# Patient Record
Sex: FEMALE | Race: WHITE
[De-identification: names, ages, dates, MRNs, and addresses within clinical notes are randomized per-mention and may not be internally consistent; named-entity substitution may affect disease eponyms.]

---
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
## 2020-08-14 ENCOUNTER — EMERGENCY (EMERGENCY)
Facility: HOSPITAL | Age: 20
LOS: 0 days | Discharge: HOME | End: 2020-08-14
Payer: COMMERCIAL

## 2020-08-14 VITALS
OXYGEN SATURATION: 100 % | DIASTOLIC BLOOD PRESSURE: 63 MMHG | TEMPERATURE: 99 F | HEART RATE: 90 BPM | SYSTOLIC BLOOD PRESSURE: 116 MMHG | RESPIRATION RATE: 20 BRPM

## 2020-08-14 DIAGNOSIS — Z11.59 ENCOUNTER FOR SCREENING FOR OTHER VIRAL DISEASES: ICD-10-CM

## 2020-08-14 PROCEDURE — U0003: CPT

## 2020-08-14 PROCEDURE — 99283 EMERGENCY DEPT VISIT LOW MDM: CPT

## 2020-08-14 NOTE — ED STATDOCS - PATIENT PORTAL LINK FT
You can access the FollowMyHealth Patient Portal offered by [facility] by registering at the following website: [email or web address]. By joining [employer]’s FollowMyHealth portal, you will also be able to view your health information using other applications (apps) compatible with our system.

## 2020-08-14 NOTE — ED STATDOCS - OBJECTIVE STATEMENT
Pt presents to ED for covid swab. needs swab for school. No complaints at this time  Pt here for testing.

## 2020-08-14 NOTE — ED STATDOCS - PHYSICAL EXAMINATION
Constitutional: NAD AAOx3. Nontoxic, well appearing. Speaking full sentences  w/o distress  Eyes: EOMI, pupils equal  Head: Normocephalic atraumatic  Mouth: no airway obstruction  Cardiac: [id number]   Resp: Lungs CTAB  GI: Abd s/nt/nd  Neuro: motor and sensory intact

## 2020-08-15 LAB — SARS-COV-2 RNA SPEC QL NAA+PROBE: SIGNIFICANT CHANGE UP

## 2020-09-04 ENCOUNTER — EMERGENCY (EMERGENCY)
Facility: HOSPITAL | Age: 20
LOS: 0 days | Discharge: HOME | End: 2020-09-04
Payer: COMMERCIAL

## 2020-09-04 VITALS
HEART RATE: 101 BPM | DIASTOLIC BLOOD PRESSURE: 64 MMHG | TEMPERATURE: 98 F | SYSTOLIC BLOOD PRESSURE: 110 MMHG | RESPIRATION RATE: 15 BRPM | OXYGEN SATURATION: 100 %

## 2020-09-04 DIAGNOSIS — Z20.828 CONTACT WITH AND (SUSPECTED) EXPOSURE TO OTHER VIRAL COMMUNICABLE DISEASES: ICD-10-CM

## 2020-09-04 PROCEDURE — 99283 EMERGENCY DEPT VISIT LOW MDM: CPT

## 2020-09-04 PROCEDURE — U0003: CPT

## 2020-09-04 NOTE — ED STATDOCS - PHYSICAL EXAMINATION
Constitutional: NAD AAOx3. Nontoxic, well appearing. Speaking full sentences  w/o distress  Eyes: EOMI, pupils equal  Head: Normocephalic atraumatic  Mouth: no airway obstruction  Cardiac: [id number]   Resp: Lungs CTA B/L  Abd: soft, nd. NTTP. No r/g/r.   Neuro: motor and sensory intact

## 2020-09-04 NOTE — ED STATDOCS - PATIENT PORTAL LINK FT
You can access the FollowMyHealth Patient Portal offered by [facility] by registering at the following website: [email or web address]. By joining [employer]’s FollowMyHealth portal, you will also be able to view your health information using other applications (apps) compatible with our system.

## 2020-09-05 LAB — SARS-COV-2 RNA SPEC QL NAA+PROBE: SIGNIFICANT CHANGE UP

## 2020-11-24 ENCOUNTER — OUTPATIENT (OUTPATIENT)
Dept: OUTPATIENT SERVICES | Facility: HOSPITAL | Age: 20
LOS: 1 days | End: 2020-11-24
Payer: COMMERCIAL

## 2020-11-24 DIAGNOSIS — Z11.59 ENCOUNTER FOR SCREENING FOR OTHER VIRAL DISEASES: ICD-10-CM

## 2020-11-24 LAB — SARS-COV-2 RNA SPEC QL NAA+PROBE: SIGNIFICANT CHANGE UP

## 2020-11-24 PROCEDURE — U0003: CPT

## 2020-11-25 DIAGNOSIS — Z11.59 ENCOUNTER FOR SCREENING FOR OTHER VIRAL DISEASES: ICD-10-CM

## 2023-03-21 ENCOUNTER — OFFICE (OUTPATIENT)
Dept: URBAN - METROPOLITAN AREA CLINIC 102 | Facility: CLINIC | Age: 23
Setting detail: OPHTHALMOLOGY
End: 2023-03-21
Payer: COMMERCIAL

## 2023-03-21 DIAGNOSIS — H52.13: ICD-10-CM

## 2023-03-21 DIAGNOSIS — H16.223: ICD-10-CM

## 2023-03-21 DIAGNOSIS — H16.221: ICD-10-CM

## 2023-03-21 DIAGNOSIS — H16.222: ICD-10-CM

## 2023-03-21 PROBLEM — H52.7 REFRACTIVE ERROR: Status: ACTIVE | Noted: 2023-03-21

## 2023-03-21 PROCEDURE — 92004 COMPRE OPH EXAM NEW PT 1/>: CPT | Performed by: OPHTHALMOLOGY

## 2023-03-21 PROCEDURE — 92015 DETERMINE REFRACTIVE STATE: CPT | Performed by: OPHTHALMOLOGY

## 2023-03-21 ASSESSMENT — REFRACTION_MANIFEST
OD_VA1: 20/20
OD_SPHERE: -1.00
OS_SPHERE: +0.50
OD_CYLINDER: -0.50
OU_VA: 20/20
OS_CYLINDER: SPH
OS_VA1: 20/20
OD_AXIS: 075

## 2023-03-21 ASSESSMENT — CONFRONTATIONAL VISUAL FIELD TEST (CVF)
OS_FINDINGS: FULL
OD_FINDINGS: FULL

## 2023-03-21 ASSESSMENT — KERATOMETRY
OS_AXISANGLE_DEGREES: 090
OS_K2POWER_DIOPTERS: 46.25
OS_K1POWER_DIOPTERS: 46.25
OD_K2POWER_DIOPTERS: 45.50
OD_AXISANGLE_DEGREES: 110
METHOD_AUTO_MANUAL: AUTO
OD_K1POWER_DIOPTERS: 45.25

## 2023-03-21 ASSESSMENT — REFRACTION_AUTOREFRACTION
OS_CYLINDER: -0.25
OD_SPHERE: -1.00
OS_AXIS: 080
OD_CYLINDER: -0.50
OD_AXIS: 077
OS_SPHERE: +0.50

## 2023-03-21 ASSESSMENT — REFRACTION_CURRENTRX
OS_SPHERE: +0.50
OS_AXIS: 180
OD_OVR_VA: 20/
OS_OVR_VA: 20/
OS_CYLINDER: SPH
OD_AXIS: 084
OD_CYLINDER: -0.25
OD_SPHERE: -1.50

## 2023-03-21 ASSESSMENT — TONOMETRY
OS_IOP_MMHG: 15
OD_IOP_MMHG: 15

## 2023-03-21 ASSESSMENT — SPHEQUIV_DERIVED
OS_SPHEQUIV: 0.375
OD_SPHEQUIV: -1.25
OD_SPHEQUIV: -1.25

## 2023-03-21 ASSESSMENT — VISUAL ACUITY
OD_BCVA: 20/20-
OS_BCVA: 20/50+2

## 2023-03-21 ASSESSMENT — AXIALLENGTH_DERIVED
OD_AL: 23.3919
OS_AL: 22.4897
OD_AL: 23.3919

## 2023-12-04 ENCOUNTER — EMERGENCY (EMERGENCY)
Facility: HOSPITAL | Age: 23
LOS: 0 days | Discharge: HOME | End: 2023-12-04
Attending: STUDENT IN AN ORGANIZED HEALTH CARE EDUCATION/TRAINING PROGRAM
Payer: COMMERCIAL

## 2023-12-04 VITALS
OXYGEN SATURATION: 97 % | SYSTOLIC BLOOD PRESSURE: 124 MMHG | HEART RATE: 81 BPM | TEMPERATURE: 98 F | RESPIRATION RATE: 20 BRPM | DIASTOLIC BLOOD PRESSURE: 87 MMHG

## 2023-12-04 VITALS — WEIGHT: 162.04 LBS | HEIGHT: 63 IN

## 2023-12-04 DIAGNOSIS — S03.00XA DISLOCATION OF JAW, UNSPECIFIED SIDE, INITIAL ENCOUNTER: ICD-10-CM
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
## 2023-12-04 PROCEDURE — 99282 EMERGENCY DEPT VISIT SF MDM: CPT | Mod: 25

## 2023-12-04 PROCEDURE — 21480 CLTX TMPRMAND DISLC 1ST/SBSQ: CPT

## 2023-12-04 PROCEDURE — 21480 CLTX TMPRMAND DISLC 1ST/SBSQ: CPT | Mod: RT

## 2023-12-04 PROCEDURE — 99283 EMERGENCY DEPT VISIT LOW MDM: CPT | Mod: 25

## 2023-12-04 NOTE — ED STATDOCS - NS ED ROS FT
General: No fever, no nausea, no vomiting  HEENT: No loc, no ha, no dizziness  Respiratory: no trouble breathing  Cardiovascular: No chest pain  GI: No abdominal pain, no diarrhea  : No urinary complaints  Endocrine: no generalized weakness  Neurological: No weakness, numbness  MSK: no recent trauma

## 2023-12-04 NOTE — ED STATDOCS - OBJECTIVE STATEMENT
24 y/o female with no pertinent PMHx presents to the ED c/o locked jaw since 30 min PTA. Has history of similar episodes x2 2 years ago. Endorses mild pain to area. Denies numbness, weakness to area. 24 y/o female with no pertinent PMHx presents to the ED c/o locked jaw since 30 min PTA. states she yawned and then jaw got stuck. Has history of similar episodes x2 2 years ago. Endorses mild pain to area. Denies numbness, weakness to area. no trauma or fall 22 y/o female with no pertinent PMHx presents to the ED c/o locked jaw since 30 min PTA. states she yawned and then jaw got stuck. Has history of similar episodes x2 2 years ago. Endorses mild pain to area. Denies numbness, weakness to area. no trauma or fall

## 2023-12-04 NOTE — ED STATDOCS - CLINICAL SUMMARY MEDICAL DECISION MAKING FREE TEXT BOX
Most likely jaw mandible dislocation. Plan jaw reduction, pain control, reassess, dc with follow up.

## 2023-12-04 NOTE — ED PROCEDURE NOTE - CPROC ED TIME OUT STATEMENT1
“Patient's name, , procedure and correct site were confirmed during the [facility] Timeout.” “Patient's name, , procedure and correct site were confirmed during the [facility] Timeout.”

## 2023-12-04 NOTE — ED STATDOCS - NSFOLLOWUPINSTRUCTIONS_ED_ALL_ED_FT
Jaw Dislocation  Side view of the head showing the jawbones. One close-up shows a normal joint. Another close-up shows a dislocated joint.  Jaw dislocation is displacement of the temporomandibular joint, which is the place where the upper jawbone (maxilla) and the lower jawbone (mandible) meet. Soon after the dislocation, the jaw muscles tighten, causing pain and difficulty moving the mouth and jaw normally.    What are the causes?  Common causes of this condition include:  A hard, direct hit or injury (trauma) to the jaw.  Opening the mouth too widely. This can be caused by:  Eating.  Yawning.  Laughing.  Vomiting.  Having a dental procedure.  Receiving medicine by mouth to make you fall asleep (general anesthetic).  Seizures or a side effect of certain medicines (dystonic reaction).  What increases the risk?  You are more likely to develop this condition if:  You have dislocated your jaw before.  You play contact sports.  Your jaw is loose or can move beyond the normal range.  You have certain connective tissue disorders that can make the jaw ligaments looser, such as Marfan syndrome or Juan Jose–Danlos syndrome. Ligaments are tissues that connect bones to each other.  What are the signs or symptoms?  Symptoms of this condition vary depending on the severity of the dislocation. Symptoms may include:  Severe pain, especially when trying to move your jaw.  Not being able to move your jaw or close your mouth completely or at all.  Feeling that your teeth do not line up as they usually do when you bite.  Drooling.  Trouble speaking or swallowing.  How is this diagnosed?  This condition is diagnosed based on your medical history and a physical exam. Your health care provider will ask you to move your jaw, and he or she will feel your temporomandibular joints. Your health care provider will also check the inside of your mouth for:  Breaks (fractures) in your jawbone.  Cuts (lacerations).  In certain cases, such as those involving trauma, imaging studies such as X-rays or CT scans may be done. These help to make sure there is no other underlying injury, such as a facial fracture.    How is this treated?  The most common treatment for this condition is to have your health care provider move your jaw back into place by hand (manual reduction). In difficult cases, medicines may be given for pain to reduce muscle tension before the procedure. Your health care provider will determine if this is appropriate. Also, your health care provider may recommend:  NSAIDs, such as ibuprofen, to reduce pain and swelling.  Medicines to relax your muscles.  A neck brace to support your lower jawbone.  A temporary elastic bandage that wraps around your jaw and your head, to make sure your jaw does not dislocate after the reduction.  Follow these instructions at home:  Eating and drinking    Follow instructions from your health care provider about eating or drinking restrictions while your jaw is healing. These may include eating:  Soft foods that do not require a lot chewing.  Liquefied foods.  Food that has been cut into small pieces.  For the first several days, make sure to take only small, measured bites. Avoid overextending your jaw when you eat.  If you have a brace or bandage:    Wear the brace or bandage as told by your health care provider. Remove it only as told by your health care provider.  Check the skin around the brace or bandage every day. Tell your health care provider about any concerns.  Loosen the brace or bandage if your jaw feels numb.  Keep the brace or bandage clean.  If the brace or bandage is not waterproof:  Do not let it get wet.  Cover it with a watertight covering when you take a bath or shower.  Managing pain, stiffness, and swelling    Bag of ice on a towel on the skin.  If directed, put ice on the injured area. To do this:  If you have a brace or bandage, remove it as told by your health care provider.  Put ice in a plastic bag.  Place a towel between your skin and the bag.  Leave the ice on for 20 minutes, 2–3 times a day.  Remove the ice if your skin turns bright red. This is very important. If you cannot feel pain, heat, or cold, you have a greater risk of damage to the area.  Activity    Do not open your mouth widely until your health care provider says that it is safe for you to do that.  Rest your jaw as told by your health care provider.  Avoid activities similar to the one that caused your injury.  Avoid opening your jaw widely when you laugh or yawn.  If you need to sneeze or yawn, place a hand under your jaw to prevent it from opening too widely.  General instructions    Take over-the-counter and prescription medicines only as told by your health care provider.  Do not take baths, swim, or use a hot tub until your health care provider approves. Ask your health care provider if you may take showers. You may only be allowed to take sponge baths.  Do not use any products that contain nicotine or tobacco. These products include cigarettes, chewing tobacco, and vaping devices, such as e-cigarettes. If you need help quitting, ask your health care provider.  Keep all follow-up visits. This is important.  Contact a health care provider if:  Your pain gets worse.  Medicines do not help your pain.  Get help right away if:  Your jaw becomes dislocated again.  You have difficulty breathing, eating, or swallowing.  These symptoms may represent a serious problem that is an emergency. Do not wait to see if the symptoms will go away. Get medical help right away. Call your local emergency services (911 in the [place] Do not drive yourself to the hospital.    Summary  Jaw dislocation is displacement of the temporomandibular joint, which is the place where the upper and lower jawbones meet. This is often caused by trauma or from opening your mouth too widely.  Jaw dislocation causes tightening of the jaw muscles, difficulty closing your mouth, and pain in your jaw.  The most common treatment for this condition is to have your health care provider move your jaw back into place by hand (manual reduction). This may require pain medicine to help make the reduction easier.  This information is not intended to replace advice given to you by your health care provider. Make sure you discuss any questions you have with your health care provider.

## 2023-12-04 NOTE — ED STATDOCS - NS ED ATTENDING STATEMENT MOD
This was a shared visit with the [facility]. I reviewed and verified the documentation and independently performed the documented:

## 2023-12-04 NOTE — ED ADULT TRIAGE NOTE - CHIEF COMPLAINT QUOTE
patient presents with mother c/o jaw pain.  reports jaw has been locked open for the last 30 minutes.  reports hx of 2 similar episodes.

## 2023-12-04 NOTE — ED STATDOCS - PHYSICAL EXAMINATION
General: Patient in no acute distress, AAOX3.   HENMT: NC/AT, no nasal congestion, MMM, unable to close jaw   Neck: supple  CVS: regular rate and rhythm, no murmur  Resp: Good air entry bilaterally, No wheeze/rhonchi.  Abd: Soft non tender, non distended, +bowel sounds, No guarding, rebound tenderness   Ext: FROM in all ext, 2+ pulses throughout, cap refill<2 sec.  BACK: no midline tenderness, no stepoffs, no CVA ttp  NEURO: no focal deficit, gross motor and sensory intact throughout, gait stable.

## 2023-12-04 NOTE — ED STATDOCS - PATIENT PORTAL LINK FT
You can access the FollowMyHealth Patient Portal offered by [facility] by registering at the following website: [email or web address]. By joining [employer]’s FollowMyHealth portal, you will also be able to view your health information using other applications (apps) compatible with our system. You can access the FollowMyHealth Patient Portal offered by [facility] by registering at the following website: [email or web address]. By joining [employer]’s FollowMyHealth portal, you will also be able to view your health information using other applications (apps) compatible with our system.

## 2023-12-04 NOTE — ED STATDOCS - ATTENDING APP SHARED VISIT CONTRIBUTION OF CARE
I, Vesna Roche DO, personally saw the patient with ACP.  I have personally performed a face to face diagnostic evaluation on this patient.    The initial assessment was performed by myself and then the patient was handed off to the ACP. The patient was followed and re-evaluated by the ACP. All labs, imaging and procedures were evaluated and performed by the ACP and I was available for consultation if any questions in the patients care came up.